# Patient Record
Sex: MALE | Race: WHITE | NOT HISPANIC OR LATINO
[De-identification: names, ages, dates, MRNs, and addresses within clinical notes are randomized per-mention and may not be internally consistent; named-entity substitution may affect disease eponyms.]

---

## 2019-04-30 PROBLEM — Z00.00 ENCOUNTER FOR PREVENTIVE HEALTH EXAMINATION: Status: ACTIVE | Noted: 2019-04-30

## 2019-10-03 ENCOUNTER — APPOINTMENT (OUTPATIENT)
Dept: CARDIOLOGY | Facility: CLINIC | Age: 69
End: 2019-10-03
Payer: MEDICARE

## 2019-10-03 PROCEDURE — 93000 ELECTROCARDIOGRAM COMPLETE: CPT

## 2019-10-03 PROCEDURE — 99214 OFFICE O/P EST MOD 30 MIN: CPT

## 2019-10-17 ENCOUNTER — OUTPATIENT (OUTPATIENT)
Dept: OUTPATIENT SERVICES | Facility: HOSPITAL | Age: 69
LOS: 1 days | Discharge: HOME | End: 2019-10-17
Payer: MEDICARE

## 2019-10-17 DIAGNOSIS — R07.89 OTHER CHEST PAIN: ICD-10-CM

## 2019-10-17 PROCEDURE — 78452 HT MUSCLE IMAGE SPECT MULT: CPT | Mod: 26

## 2019-10-31 ENCOUNTER — APPOINTMENT (OUTPATIENT)
Dept: CARDIOLOGY | Facility: CLINIC | Age: 69
End: 2019-10-31

## 2020-02-06 ENCOUNTER — APPOINTMENT (OUTPATIENT)
Dept: CARDIOLOGY | Facility: CLINIC | Age: 70
End: 2020-02-06
Payer: MEDICARE

## 2020-02-06 PROCEDURE — 93306 TTE W/DOPPLER COMPLETE: CPT

## 2020-02-13 ENCOUNTER — APPOINTMENT (OUTPATIENT)
Dept: CARDIOLOGY | Facility: CLINIC | Age: 70
End: 2020-02-13
Payer: MEDICARE

## 2020-02-13 PROCEDURE — 93000 ELECTROCARDIOGRAM COMPLETE: CPT

## 2020-02-13 PROCEDURE — 99214 OFFICE O/P EST MOD 30 MIN: CPT

## 2020-08-20 ENCOUNTER — RECORD ABSTRACTING (OUTPATIENT)
Age: 70
End: 2020-08-20

## 2020-08-20 DIAGNOSIS — Z87.11 PERSONAL HISTORY OF PEPTIC ULCER DISEASE: ICD-10-CM

## 2020-08-20 DIAGNOSIS — Z87.891 PERSONAL HISTORY OF NICOTINE DEPENDENCE: ICD-10-CM

## 2020-08-20 DIAGNOSIS — Z86.79 PERSONAL HISTORY OF OTHER DISEASES OF THE CIRCULATORY SYSTEM: ICD-10-CM

## 2020-08-20 DIAGNOSIS — Z87.898 PERSONAL HISTORY OF OTHER SPECIFIED CONDITIONS: ICD-10-CM

## 2020-08-20 DIAGNOSIS — Z87.19 PERSONAL HISTORY OF OTHER DISEASES OF THE DIGESTIVE SYSTEM: ICD-10-CM

## 2020-08-20 DIAGNOSIS — Z78.9 OTHER SPECIFIED HEALTH STATUS: ICD-10-CM

## 2020-08-20 DIAGNOSIS — Z86.39 PERSONAL HISTORY OF OTHER ENDOCRINE, NUTRITIONAL AND METABOLIC DISEASE: ICD-10-CM

## 2020-08-20 RX ORDER — METOPROLOL TARTRATE 100 MG/1
100 TABLET, FILM COATED ORAL TWICE DAILY
Refills: 0 | Status: ACTIVE | COMMUNITY

## 2020-08-20 RX ORDER — VALSARTAN 160 MG/1
160 TABLET ORAL TWICE DAILY
Refills: 0 | Status: ACTIVE | COMMUNITY

## 2020-08-20 RX ORDER — ESOMEPRAZOLE MAGNESIUM 40 MG/1
40 CAPSULE, DELAYED RELEASE ORAL DAILY
Refills: 0 | Status: ACTIVE | COMMUNITY

## 2020-09-15 ENCOUNTER — APPOINTMENT (OUTPATIENT)
Dept: CARDIOLOGY | Facility: CLINIC | Age: 70
End: 2020-09-15
Payer: MEDICARE

## 2020-09-15 VITALS
DIASTOLIC BLOOD PRESSURE: 80 MMHG | TEMPERATURE: 98.2 F | WEIGHT: 250 LBS | SYSTOLIC BLOOD PRESSURE: 132 MMHG | HEIGHT: 70 IN | BODY MASS INDEX: 35.79 KG/M2

## 2020-09-15 PROCEDURE — 93000 ELECTROCARDIOGRAM COMPLETE: CPT

## 2020-09-15 PROCEDURE — 99213 OFFICE O/P EST LOW 20 MIN: CPT

## 2020-09-15 NOTE — REVIEW OF SYSTEMS
[Blurry Vision] : no blurred vision [Seeing Double (Diplopia)] : no diplopia [Joint Pain] : joint pain [Skin: A Rash] : no rash: [Anxiety] : no anxiety [Easy Bleeding] : no tendency for easy bleeding [Easy Bruising] : no tendency for easy bruising [Negative] : Heme/Lymph

## 2020-09-15 NOTE — ASSESSMENT
[FreeTextEntry1] : Permanent A-fib, VR controlled.\par CHF well compensated.\par HTN controlled.\par Normal LVEF, no evidence of ischemia.\par Low risk for cardiovascular complications of TKR.\par \par Recommend:\par Continue treatment.\par Can hold Coumadin for 4 days if decides to proceed with TKR, no bridging needed.\par No SBE prophylaxis.\par \par F/u in 4 months.\par \par Sarthak Bradshaw MD\par

## 2020-09-15 NOTE — HISTORY OF PRESENT ILLNESS
[FreeTextEntry1] : 70-yo male with h/o obesity, HTN, diastolic heart failure, permanent A-fib.\par \par No CP, SOB, palpitations now. \par \par 2D ECHO 02/06/20:\par LVEF 55%\par Moderate LAE\par Mild MR, TR\par Mildly restricted and thickened AV.\par \par Adenosine MPI 10/17/19:\par No ischemia.\par

## 2020-09-15 NOTE — PHYSICAL EXAM
[General Appearance - Well Developed] : well developed [Normal Appearance] : normal appearance [Well Groomed] : well groomed [General Appearance - Well Nourished] : well nourished [No Deformities] : no deformities [General Appearance - In No Acute Distress] : no acute distress [Normal Conjunctiva] : the conjunctiva exhibited no abnormalities [Eyelids - No Xanthelasma] : the eyelids demonstrated no xanthelasmas [Respiration, Rhythm And Depth] : normal respiratory rhythm and effort [Exaggerated Use Of Accessory Muscles For Inspiration] : no accessory muscle use [Auscultation Breath Sounds / Voice Sounds] : lungs were clear to auscultation bilaterally [Normal Rate] : normal [Irregularly Irregular] : irregularly irregular [Normal S1] : normal S1 [Normal S2] : normal S2 [S3] : no S3 [S4] : no S4 [II] : a grade 2 [2+] : left 2+ [___ +] : bilateral [unfilled]U+ pitting edema to the ankles [Bowel Sounds] : normal bowel sounds [Abdomen Soft] : soft [Abdomen Tenderness] : non-tender [Abdomen Mass (___ Cm)] : no abdominal mass palpated [Cyanosis, Localized] : no localized cyanosis [Skin Color & Pigmentation] : normal skin color and pigmentation [] : no rash [Oriented To Time, Place, And Person] : oriented to person, place, and time [Affect] : the affect was normal [Mood] : the mood was normal

## 2021-01-19 ENCOUNTER — APPOINTMENT (OUTPATIENT)
Dept: CARDIOLOGY | Facility: CLINIC | Age: 71
End: 2021-01-19

## 2021-02-05 ENCOUNTER — APPOINTMENT (OUTPATIENT)
Dept: CARDIOLOGY | Facility: CLINIC | Age: 71
End: 2021-02-05
Payer: MEDICARE

## 2021-02-05 VITALS
HEIGHT: 69 IN | DIASTOLIC BLOOD PRESSURE: 82 MMHG | SYSTOLIC BLOOD PRESSURE: 122 MMHG | WEIGHT: 235 LBS | BODY MASS INDEX: 34.8 KG/M2

## 2021-02-05 DIAGNOSIS — Z01.810 ENCOUNTER FOR PREPROCEDURAL CARDIOVASCULAR EXAMINATION: ICD-10-CM

## 2021-02-05 PROCEDURE — 99214 OFFICE O/P EST MOD 30 MIN: CPT

## 2021-02-05 PROCEDURE — 93000 ELECTROCARDIOGRAM COMPLETE: CPT

## 2021-02-05 RX ORDER — DAPAGLIFLOZIN 10 MG/1
10 TABLET, FILM COATED ORAL DAILY
Refills: 0 | Status: ACTIVE | COMMUNITY

## 2021-02-10 ENCOUNTER — APPOINTMENT (OUTPATIENT)
Dept: CARDIOLOGY | Facility: CLINIC | Age: 71
End: 2021-02-10
Payer: MEDICARE

## 2021-02-10 PROCEDURE — 93306 TTE W/DOPPLER COMPLETE: CPT

## 2021-02-25 ENCOUNTER — OUTPATIENT (OUTPATIENT)
Dept: OUTPATIENT SERVICES | Facility: HOSPITAL | Age: 71
LOS: 1 days | Discharge: HOME | End: 2021-02-25
Payer: MEDICARE

## 2021-02-25 ENCOUNTER — RESULT REVIEW (OUTPATIENT)
Age: 71
End: 2021-02-25

## 2021-02-25 DIAGNOSIS — Z01.810 ENCOUNTER FOR PREPROCEDURAL CARDIOVASCULAR EXAMINATION: ICD-10-CM

## 2021-02-25 PROCEDURE — 93018 CV STRESS TEST I&R ONLY: CPT

## 2021-02-25 PROCEDURE — 78452 HT MUSCLE IMAGE SPECT MULT: CPT | Mod: 26

## 2021-04-14 NOTE — HISTORY OF PRESENT ILLNESS
[FreeTextEntry1] : 70-yo male with h/o obesity, HTN, diastolic heart failure, permanent A-fib.\par \par No CP, SOB, palpitations now. His BP has dropped < 100 twice in the last 2 weeks, he feels weak and tired when that happens.\par \par Patient c/o progressive left knee pain, severely limiting his walking at this point. He is undergoing evaluation for TKR.\par \par 2D ECHO 02/06/20:\par LVEF 55%\par Moderate LAE\par Mild-mod MR, mild TR\par Mildly restricted and thickened AV.\par \par

## 2021-04-14 NOTE — PHYSICAL EXAM
[General Appearance - Well Developed] : well developed [Normal Appearance] : normal appearance [Well Groomed] : well groomed [General Appearance - Well Nourished] : well nourished [No Deformities] : no deformities [General Appearance - In No Acute Distress] : no acute distress [Normal Conjunctiva] : the conjunctiva exhibited no abnormalities [Eyelids - No Xanthelasma] : the eyelids demonstrated no xanthelasmas [Respiration, Rhythm And Depth] : normal respiratory rhythm and effort [Exaggerated Use Of Accessory Muscles For Inspiration] : no accessory muscle use [Auscultation Breath Sounds / Voice Sounds] : lungs were clear to auscultation bilaterally [Normal Rate] : normal [Irregularly Irregular] : irregularly irregular [Normal S1] : normal S1 [Normal S2] : normal S2 [II] : a grade 2 [2+] : left 2+ [___ +] : bilateral [unfilled]U+ pitting edema to the ankles [Bowel Sounds] : normal bowel sounds [Abdomen Soft] : soft [Abdomen Tenderness] : non-tender [Abdomen Mass (___ Cm)] : no abdominal mass palpated [Cyanosis, Localized] : no localized cyanosis [Skin Color & Pigmentation] : normal skin color and pigmentation [] : no rash [Oriented To Time, Place, And Person] : oriented to person, place, and time [Affect] : the affect was normal [Mood] : the mood was normal [S3] : no S3 [S4] : no S4

## 2021-04-14 NOTE — ASSESSMENT
[FreeTextEntry1] : 70-yo male with h/o DM, HTN, hyperlipidemia.\par Permanent A-fib, VR controlled now.\par Chronic diastolic CHF, euvolemic today.\par Mild-mod MTR.\par Low functional capacity due to knee pain.\par Preoperative evaluation for TKR.\par \par Plan:\par Continue Metoprolol 200 mg bid.\par Continue Doxazosin in the evening.\par WIll reduce HCTZ to 3 times a week if continues to experience episodes of low BP.\par 2D ECHO.\par Adenosine MPI prior to TKR.\par F/u in 3 months.\par \par Sarthak Bradshaw MD\par \par

## 2021-04-14 NOTE — ADDENDUM
[FreeTextEntry1] : Adenosine MPI:\par No ischemia.\par \par Impression:\par Low risk for cardiovascular complications of TKR.\par \par Recommend:\par Proceed with surgery.\par Continue Metoprolol.\par Hold Coumadin for 3 days before surgery, resume after surgery. No bridging needed. \par No SBE prophylaxis.

## 2021-04-14 NOTE — REVIEW OF SYSTEMS
[see HPI] : see HPI [Joint Pain] : joint pain [Negative] : Heme/Lymph [Blurry Vision] : no blurred vision [Seeing Double (Diplopia)] : no diplopia [Lower Ext Edema] : no extremity edema [Leg Claudication] : no intermittent leg claudication [Skin: A Rash] : no rash: [Anxiety] : no anxiety [Easy Bleeding] : no tendency for easy bleeding [Easy Bruising] : no tendency for easy bruising

## 2021-05-20 ENCOUNTER — APPOINTMENT (OUTPATIENT)
Dept: CARDIOLOGY | Facility: CLINIC | Age: 71
End: 2021-05-20
Payer: MEDICARE

## 2021-05-20 VITALS
WEIGHT: 220 LBS | SYSTOLIC BLOOD PRESSURE: 116 MMHG | HEIGHT: 69 IN | BODY MASS INDEX: 32.58 KG/M2 | DIASTOLIC BLOOD PRESSURE: 70 MMHG

## 2021-05-20 DIAGNOSIS — I35.1 NONRHEUMATIC AORTIC (VALVE) INSUFFICIENCY: ICD-10-CM

## 2021-05-20 PROCEDURE — 93000 ELECTROCARDIOGRAM COMPLETE: CPT

## 2021-05-20 PROCEDURE — 99213 OFFICE O/P EST LOW 20 MIN: CPT

## 2021-05-20 NOTE — PHYSICAL EXAM
[Well Developed] : well developed [Well Nourished] : well nourished [No Acute Distress] : no acute distress [Normal Conjunctiva] : normal conjunctiva [Normal Venous Pressure] : normal venous pressure [No Carotid Bruit] : no carotid bruit [Normal Rate] : normal [Irregularly Irregular] : irregularly irregular [Normal S1] : normal S1 [Normal S2] : normal S2 [S3] : no S3 [S4] : no S4 [II] : a grade 2 [No Pitting Edema] : no pitting edema present [Clear Lung Fields] : clear lung fields [Good Air Entry] : good air entry [No Respiratory Distress] : no respiratory distress  [Soft] : abdomen soft [Non Tender] : non-tender [Normal Bowel Sounds] : normal bowel sounds [Normal Gait] : normal gait [No Edema] : no edema [No Cyanosis] : no cyanosis [No Clubbing] : no clubbing [No Varicosities] : no varicosities [No Rash] : no rash [Moves all extremities] : moves all extremities [No Focal Deficits] : no focal deficits [Normal Speech] : normal speech [Alert and Oriented] : alert and oriented [Normal memory] : normal memory

## 2021-05-20 NOTE — ASSESSMENT
[FreeTextEntry1] : 70-yo male with h/o DM, HTN, hyperlipidemia.\par Permanent A-fib, VR controlled now.\par Chronic diastolic CHF, euvolemic today.\par Mild-mod MTR.\par \par Plan:\par Continue treatment.\par Continue diet, weight loss.\par Repeat BW prior to next visit.\par F/u in 4 months.\par \par Sarthak Bradshaw MD\par \par

## 2021-05-20 NOTE — HISTORY OF PRESENT ILLNESS
[FreeTextEntry1] : 70-yo male with h/o obesity, HTN, diastolic heart failure, permanent A-fib.\par \par No CP, SOB, palpitations now. His BP has been stable. S/p left TKR, uncomplicated. Undergoing PT.\par \par \par GFR 80\par \par TSH 0.9.\par

## 2021-05-20 NOTE — CARDIOLOGY SUMMARY
[de-identified] : 05/20/21:\par A-fib, VR 71/min, NSST changes. [de-identified] : 02/06/20:\par LVEF 55%\par Moderate LAE\par Mild-mod MR, mild TR\par Mildly restricted and thickened AV. [de-identified] : Adenosine MPI 02/25/21:\par No ischemia.

## 2021-08-24 ENCOUNTER — APPOINTMENT (OUTPATIENT)
Dept: CARDIOLOGY | Facility: CLINIC | Age: 71
End: 2021-08-24
Payer: MEDICARE

## 2021-08-24 VITALS
HEIGHT: 60 IN | SYSTOLIC BLOOD PRESSURE: 120 MMHG | DIASTOLIC BLOOD PRESSURE: 70 MMHG | BODY MASS INDEX: 42.6 KG/M2 | TEMPERATURE: 97.2 F | WEIGHT: 217 LBS

## 2021-08-24 PROCEDURE — 93000 ELECTROCARDIOGRAM COMPLETE: CPT

## 2021-08-24 PROCEDURE — 99214 OFFICE O/P EST MOD 30 MIN: CPT

## 2021-08-24 NOTE — CARDIOLOGY SUMMARY
[de-identified] : 08/24/21:\par A-fib, VR 80/min, NSST changes. [de-identified] : 02/10/21:\par LVEF 63%\par LAE, ANGELIKA\par Mod MR, mild TR, AI\par Mildly restricted and thickened AV. [de-identified] : Adenosine MPI 02/25/21:\par No ischemia.

## 2021-08-24 NOTE — HISTORY OF PRESENT ILLNESS
[FreeTextEntry1] : 70-yo male with h/o obesity, HTN, diastolic heart failure, permanent A-fib.\par \par S/p left TKR, uncomplicated. Tolerated PT and exercised regularly until 2 weeks ago when he started feeling progressive HERRERA. 2 days ago, he wasn't able to walk without stopping to catch his breath. No CP or palpitations. Increased leg edema.\par \par \par GFR 67\par K 3.7\par LDL 93\par TSH 1.12.\par

## 2021-08-24 NOTE — PHYSICAL EXAM
[Well Developed] : well developed [Well Nourished] : well nourished [No Acute Distress] : no acute distress [Normal Conjunctiva] : normal conjunctiva [Normal Venous Pressure] : normal venous pressure [No Carotid Bruit] : no carotid bruit [Normal Rate] : normal [Irregularly Irregular] : irregularly irregular [Normal S1] : normal S1 [Normal S2] : normal S2 [II] : a grade 2 [Clear Lung Fields] : clear lung fields [Good Air Entry] : good air entry [No Respiratory Distress] : no respiratory distress  [Soft] : abdomen soft [Non Tender] : non-tender [Normal Bowel Sounds] : normal bowel sounds [Normal Gait] : normal gait [No Edema] : no edema [No Cyanosis] : no cyanosis [No Clubbing] : no clubbing [No Varicosities] : no varicosities [No Rash] : no rash [Moves all extremities] : moves all extremities [No Focal Deficits] : no focal deficits [Normal Speech] : normal speech [Alert and Oriented] : alert and oriented [Normal memory] : normal memory [S3] : no S3 [S4] : no S4 [___ +] : bilateral [unfilled]U+ pitting edema to the ankles

## 2021-08-24 NOTE — ASSESSMENT
[FreeTextEntry1] : 70-yo male with h/o DM, HTN, hyperlipidemia.\par Permanent A-fib, VR controlled now.\par Chronic diastolic CHF, decompensation now likely due to dietary non-compliance.\par Moderate MR.\par \par Plan:\par Hold HCTZ.\par Increase Furosemide to 40 mg bid. Add K-dur.\par Low-salt diet, weight loss discussed again.\par F/u in 1 week.\par \par Sarthak Bradshaw MD\par \par

## 2021-08-24 NOTE — REVIEW OF SYSTEMS
[Negative] : Neurological [Dyspnea on exertion] : dyspnea during exertion [Lower Ext Edema] : lower extremity edema [Leg Claudication] : no intermittent leg claudication [Palpitations] : no palpitations [Orthopnea] : orthopnea [Syncope] : no syncope [Rash] : no rash [Anxiety] : no anxiety [Easy Bleeding] : no tendency for easy bleeding [Easy Bruising] : no tendency for easy bruising

## 2021-08-25 ENCOUNTER — RESULT CHARGE (OUTPATIENT)
Age: 71
End: 2021-08-25

## 2021-09-09 ENCOUNTER — APPOINTMENT (OUTPATIENT)
Dept: CARDIOLOGY | Facility: CLINIC | Age: 71
End: 2021-09-09
Payer: MEDICARE

## 2021-09-09 VITALS
BODY MASS INDEX: 43.19 KG/M2 | HEIGHT: 60 IN | WEIGHT: 220 LBS | DIASTOLIC BLOOD PRESSURE: 84 MMHG | SYSTOLIC BLOOD PRESSURE: 132 MMHG

## 2021-09-09 PROCEDURE — 93000 ELECTROCARDIOGRAM COMPLETE: CPT

## 2021-09-09 PROCEDURE — 99213 OFFICE O/P EST LOW 20 MIN: CPT

## 2021-09-09 NOTE — ASSESSMENT
[FreeTextEntry1] : 70-yo male with h/o DM, HTN, hyperlipidemia.\par Permanent A-fib, VR controlled now.\par Chronic diastolic CHF, decompensation now likely due to dietary non-compliance.\par Moderate MR.\par \par Plan:\par Continue Furosemide 40 mg with K-dur.\par Low-salt diet, weight loss discussed again.\par F/u in 1 month.\par \par Sarthak Bradshaw MD\par \par  I have personally seen and examined this patient.  I have fully participated in the care of this patient. I have reviewed all pertinent clinical information, including history, physical exam, plan and the Resident’s note and agree except as noted.

## 2021-09-09 NOTE — HISTORY OF PRESENT ILLNESS
[FreeTextEntry1] : 71-yo male with h/o obesity, HTN, diastolic heart failure, permanent A-fib.\par \par S/p left TKR, uncomplicated. Tolerated PT and exercised regularly initially but developed fluid overload due to dietary non-compliance. Cough and orthopnea have resolved since last visit but patient still c/o dyspnea on minimal exertion. No CP or palpitations.\par \par GFR 67\par K 3.7\par LDL 93\par TSH 1.12.\par

## 2021-09-09 NOTE — REVIEW OF SYSTEMS
[Dyspnea on exertion] : dyspnea during exertion [Lower Ext Edema] : lower extremity edema [Negative] : Neurological [Leg Claudication] : no intermittent leg claudication [Palpitations] : no palpitations [Syncope] : no syncope [Rash] : no rash [Anxiety] : no anxiety [Easy Bleeding] : no tendency for easy bleeding [Easy Bruising] : no tendency for easy bruising

## 2021-09-09 NOTE — PHYSICAL EXAM
[Well Developed] : well developed [Well Nourished] : well nourished [No Acute Distress] : no acute distress [Normal Conjunctiva] : normal conjunctiva [Normal Venous Pressure] : normal venous pressure [No Carotid Bruit] : no carotid bruit [Normal Rate] : normal [Irregularly Irregular] : irregularly irregular [Normal S1] : normal S1 [Normal S2] : normal S2 [II] : a grade 2 [___ +] : bilateral [unfilled]U+ pitting edema to the ankles [Clear Lung Fields] : clear lung fields [Good Air Entry] : good air entry [No Respiratory Distress] : no respiratory distress  [Soft] : abdomen soft [Non Tender] : non-tender [Normal Bowel Sounds] : normal bowel sounds [Normal Gait] : normal gait [No Cyanosis] : no cyanosis [No Clubbing] : no clubbing [No Varicosities] : no varicosities [No Rash] : no rash [Moves all extremities] : moves all extremities [No Focal Deficits] : no focal deficits [Normal Speech] : normal speech [Alert and Oriented] : alert and oriented [Normal memory] : normal memory [S3] : no S3 [S4] : no S4 [Edema ___] : edema [unfilled]

## 2021-09-09 NOTE — CARDIOLOGY SUMMARY
[de-identified] : 09/09/21:\par A-fib, VR 65/min, NSST changes. [de-identified] : 02/10/21:\par LVEF 63%\par LAE, ANGELIKA\par Mod MR, mild TR, AI\par Mildly restricted and thickened AV. [de-identified] : Adenosine MPI 02/25/21:\par No ischemia.

## 2021-09-10 ENCOUNTER — RESULT CHARGE (OUTPATIENT)
Age: 71
End: 2021-09-10

## 2021-09-30 ENCOUNTER — APPOINTMENT (OUTPATIENT)
Dept: CARDIOLOGY | Facility: CLINIC | Age: 71
End: 2021-09-30

## 2021-11-05 ENCOUNTER — APPOINTMENT (OUTPATIENT)
Dept: CARDIOLOGY | Facility: CLINIC | Age: 71
End: 2021-11-05
Payer: MEDICARE

## 2021-11-05 VITALS
BODY MASS INDEX: 43.19 KG/M2 | DIASTOLIC BLOOD PRESSURE: 80 MMHG | SYSTOLIC BLOOD PRESSURE: 128 MMHG | WEIGHT: 220 LBS | HEIGHT: 60 IN

## 2021-11-05 PROCEDURE — 93000 ELECTROCARDIOGRAM COMPLETE: CPT

## 2021-11-05 PROCEDURE — 99214 OFFICE O/P EST MOD 30 MIN: CPT

## 2021-11-05 RX ORDER — HYDROCHLOROTHIAZIDE 12.5 MG/1
12.5 CAPSULE ORAL DAILY
Refills: 0 | Status: DISCONTINUED | COMMUNITY
End: 2021-11-05

## 2021-11-05 NOTE — ASSESSMENT
[FreeTextEntry1] : 70-yo male with h/o DM, HTN, hyperlipidemia.\par Permanent A-fib, VR controlled now.\par Chronic diastolic CHF, decompensation now likely due to dietary non-compliance.\par Moderate MR.\par \par Plan:\par Will try to replace Furosemide with Torsemide 20 mg bid, continue K-dur.\par Low-salt diet, weight loss discussed again.\par Blood work from PMD.\par F/u in 1 month.\par \par Sarthak Bradshaw MD\par \par

## 2021-11-05 NOTE — CARDIOLOGY SUMMARY
[de-identified] : 09/09/21:\par A-fib, VR 72/min, NSST changes. [de-identified] : 02/10/21:\par LVEF 63%\par LAE, ANGELIKA\par Mod MR, mild TR, AI\par Mildly restricted and thickened AV. [de-identified] : Adenosine MPI 02/25/21:\par No ischemia.

## 2021-11-05 NOTE — REVIEW OF SYSTEMS
[Dyspnea on exertion] : dyspnea during exertion [Negative] : Neurological [Lower Ext Edema] : lower extremity edema [Leg Claudication] : no intermittent leg claudication [Palpitations] : no palpitations [Syncope] : no syncope [Rash] : no rash [Anxiety] : no anxiety [Easy Bleeding] : no tendency for easy bleeding [Easy Bruising] : no tendency for easy bruising

## 2021-11-05 NOTE — HISTORY OF PRESENT ILLNESS
[FreeTextEntry1] : 71-yo male with h/o obesity, HTN, diastolic heart failure, permanent A-fib.\par \par S/p left TKR, uncomplicated. Tolerated PT and exercised regularly initially but developed fluid overload due to dietary non-compliance. HERRERA has improved but not resolved since last visit. He feels more SOB immediately if he skips even 1 dose of Furosemide.. No CP or palpitations.\par \par \par

## 2021-12-28 ENCOUNTER — APPOINTMENT (OUTPATIENT)
Dept: CARDIOLOGY | Facility: CLINIC | Age: 71
End: 2021-12-28
Payer: MEDICARE

## 2021-12-28 VITALS
BODY MASS INDEX: 42.8 KG/M2 | HEART RATE: 67 BPM | WEIGHT: 218 LBS | DIASTOLIC BLOOD PRESSURE: 75 MMHG | HEIGHT: 60 IN | SYSTOLIC BLOOD PRESSURE: 125 MMHG

## 2021-12-28 PROCEDURE — 93000 ELECTROCARDIOGRAM COMPLETE: CPT

## 2021-12-28 PROCEDURE — 99214 OFFICE O/P EST MOD 30 MIN: CPT

## 2021-12-28 RX ORDER — MUPIROCIN 20 MG/G
2 OINTMENT TOPICAL
Qty: 22 | Refills: 0 | Status: DISCONTINUED | COMMUNITY
Start: 2021-12-09

## 2021-12-28 RX ORDER — DUTASTERIDE 0.5 MG/1
0.5 CAPSULE, LIQUID FILLED ORAL
Qty: 30 | Refills: 0 | Status: ACTIVE | COMMUNITY
Start: 2021-12-07

## 2021-12-28 RX ORDER — ALCOHOL PREP PADS 0.7 ML/1
70 SWAB TOPICAL
Qty: 100 | Refills: 0 | Status: DISCONTINUED | COMMUNITY
Start: 2021-12-10

## 2021-12-28 RX ORDER — METFORMIN HYDROCHLORIDE 500 MG/1
500 TABLET, COATED ORAL DAILY
Refills: 0 | Status: DISCONTINUED | COMMUNITY
End: 2021-12-28

## 2021-12-28 RX ORDER — TAMSULOSIN HYDROCHLORIDE 0.4 MG/1
0.4 CAPSULE ORAL
Qty: 30 | Refills: 0 | Status: ACTIVE | COMMUNITY
Start: 2021-12-07

## 2021-12-28 NOTE — HISTORY OF PRESENT ILLNESS
[FreeTextEntry1] : 71-yo male with h/o obesity, HTN, diastolic heart failure, permanent A-fib, moderate MR.\par \par S/p left TKR, uncomplicated. Tolerated PT and exercised regularly initially but developed fluid overload due to dietary non-compliance. \par \par HERRERA and leg edema have improved since last visit but patient is unable to leave the house for hours after each dose.\par \par INR has been unstable in the last few months.

## 2021-12-28 NOTE — ASSESSMENT
[FreeTextEntry1] : 70-yo male with h/o DM, HTN, hyperlipidemia.\par Permanent A-fib, VR controlled now. INR has been difficult to control.\par Chronic diastolic CHF. Patient has clearly improved with Torsemide.\par Obesity.\par Moderate MR.\par \par Plan:\par Continue Torsemide 20 mg bid, continue K-dur. I explained to the patient that weight loss and reducing salt intake are the only ways to reduce the doses of his diuretics eventually. \par Will replace Coumadin with Xarelto 20 mg daily.\par Blood work and f/u with PMD scheduled.\par F/u in 3 months.\par \par Sarthak Bradshaw MD\par \par

## 2021-12-28 NOTE — CARDIOLOGY SUMMARY
[de-identified] : 12/28/21:\par A-fib, VR 79/min, NSST changes. [de-identified] : 02/10/21:\par LVEF 63%\par LAE, ANGELIKA\par Mod MR, mild TR, AI\par Mildly restricted and thickened AV. [de-identified] : Adenosine MPI 02/25/21:\par No ischemia.

## 2022-01-05 ENCOUNTER — RX RENEWAL (OUTPATIENT)
Age: 72
End: 2022-01-05

## 2022-01-31 ENCOUNTER — RX RENEWAL (OUTPATIENT)
Age: 72
End: 2022-01-31

## 2022-02-23 ENCOUNTER — RX RENEWAL (OUTPATIENT)
Age: 72
End: 2022-02-23

## 2022-03-24 ENCOUNTER — APPOINTMENT (OUTPATIENT)
Dept: CARDIOLOGY | Facility: CLINIC | Age: 72
End: 2022-03-24
Payer: MEDICARE

## 2022-03-24 ENCOUNTER — RESULT CHARGE (OUTPATIENT)
Age: 72
End: 2022-03-24

## 2022-03-24 VITALS
WEIGHT: 230 LBS | BODY MASS INDEX: 45.16 KG/M2 | SYSTOLIC BLOOD PRESSURE: 120 MMHG | DIASTOLIC BLOOD PRESSURE: 74 MMHG | HEIGHT: 60 IN

## 2022-03-24 PROCEDURE — 93000 ELECTROCARDIOGRAM COMPLETE: CPT

## 2022-03-24 PROCEDURE — 99213 OFFICE O/P EST LOW 20 MIN: CPT

## 2022-03-24 RX ORDER — WARFARIN 6 MG/1
6 TABLET ORAL DAILY
Refills: 0 | Status: DISCONTINUED | COMMUNITY
End: 2022-03-24

## 2022-03-24 RX ORDER — WARFARIN 1 MG/1
1 TABLET ORAL
Qty: 30 | Refills: 0 | Status: DISCONTINUED | COMMUNITY
Start: 2021-12-21 | End: 2022-03-24

## 2022-03-24 RX ORDER — FUROSEMIDE 40 MG/1
40 TABLET ORAL TWICE DAILY
Qty: 60 | Refills: 1 | Status: DISCONTINUED | COMMUNITY
End: 2022-03-24

## 2022-03-24 RX ORDER — DOXAZOSIN 2 MG/1
2 TABLET ORAL DAILY
Refills: 0 | Status: ACTIVE | COMMUNITY

## 2022-03-24 RX ORDER — WARFARIN 5 MG/1
5 TABLET ORAL
Qty: 30 | Refills: 0 | Status: DISCONTINUED | COMMUNITY
Start: 2021-12-08 | End: 2022-03-24

## 2022-03-24 NOTE — ASSESSMENT
[FreeTextEntry1] : 71-yo male with h/o DM, HTN, hyperlipidemia.\par Permanent A-fib (Xarelto).\par Chronic diastolic CHF. Patient has clearly improved with Torsemide.\par Obesity.\par Moderate MR.\par \par Plan:\par Continue Torsemide 20 mg bid, continue K-dur. I explained to the patient that weight loss and reducing salt intake are the only ways to reduce the doses of his diuretics eventually. \par Blood work and f/u with PMD scheduled.\par F/u in 6 months.\par \par Sarthak Bradshaw MD\par \par

## 2022-03-24 NOTE — HISTORY OF PRESENT ILLNESS
[FreeTextEntry1] : 71-yo male with h/o obesity, HTN, diastolic heart failure, permanent A-fib (Xarelto), moderate MR.\par \par S/p left TKR, uncomplicated. Tolerated PT and exercised regularly initially but developed fluid overload due to dietary non-compliance. \par \par HERRERA and leg edema have improved since last visit but patient is unable to leave the house for hours after each dose.\par \par

## 2022-03-24 NOTE — PHYSICAL EXAM
[Well Developed] : well developed [Well Nourished] : well nourished [No Acute Distress] : no acute distress [Normal Conjunctiva] : normal conjunctiva [Normal Venous Pressure] : normal venous pressure [No Carotid Bruit] : no carotid bruit [Normal Rate] : normal [Irregularly Irregular] : irregularly irregular [Normal S1] : normal S1 [Normal S2] : normal S2 [II] : a grade 2 [___ +] : bilateral [unfilled]U+ pitting edema to the ankles [Clear Lung Fields] : clear lung fields [Good Air Entry] : good air entry [No Respiratory Distress] : no respiratory distress  [Soft] : abdomen soft [Non Tender] : non-tender [Normal Bowel Sounds] : normal bowel sounds [Normal Gait] : normal gait [No Cyanosis] : no cyanosis [No Clubbing] : no clubbing [No Varicosities] : no varicosities [No Rash] : no rash [Moves all extremities] : moves all extremities [No Focal Deficits] : no focal deficits [Normal Speech] : normal speech [Alert and Oriented] : alert and oriented [Normal memory] : normal memory [S3] : no S3 [S4] : no S4 [No Edema] : no edema

## 2022-03-24 NOTE — CARDIOLOGY SUMMARY
[de-identified] : 03/24/22:\par A-fib, VR 67/min, NSST changes. [de-identified] : 02/10/21:\par LVEF 63%\par LAE, ANGELIKA\par Mod MR, mild TR, AI\par Mildly restricted and thickened AV. [de-identified] : Adenosine MPI 02/25/21:\par No ischemia.

## 2022-04-02 ENCOUNTER — RX RENEWAL (OUTPATIENT)
Age: 72
End: 2022-04-02

## 2022-05-23 ENCOUNTER — NON-APPOINTMENT (OUTPATIENT)
Age: 72
End: 2022-05-23

## 2022-05-24 ENCOUNTER — APPOINTMENT (OUTPATIENT)
Dept: CARDIOLOGY | Facility: CLINIC | Age: 72
End: 2022-05-24
Payer: MEDICARE

## 2022-05-24 VITALS
HEIGHT: 60 IN | HEART RATE: 75 BPM | SYSTOLIC BLOOD PRESSURE: 130 MMHG | WEIGHT: 221 LBS | BODY MASS INDEX: 43.39 KG/M2 | DIASTOLIC BLOOD PRESSURE: 86 MMHG

## 2022-05-24 DIAGNOSIS — I50.33 ACUTE ON CHRONIC DIASTOLIC (CONGESTIVE) HEART FAILURE: ICD-10-CM

## 2022-05-24 PROCEDURE — 93000 ELECTROCARDIOGRAM COMPLETE: CPT

## 2022-05-24 PROCEDURE — 99214 OFFICE O/P EST MOD 30 MIN: CPT

## 2022-05-24 NOTE — REASON FOR VISIT
[Symptom and Test Evaluation] : symptom and test evaluation [Cardiac Failure] : cardiac failure [Arrhythmia/ECG Abnorrmalities] : arrhythmia/ECG abnormalities

## 2022-05-24 NOTE — CARDIOLOGY SUMMARY
[de-identified] : 05/24/22:\par A-fib, VR 75/min, NSST changes. [de-identified] : 02/10/21:\par LVEF 63%\par LAE, ANGELIKA\par Mod MR, mild TR, AI\par Mildly restricted and thickened AV. [de-identified] : Adenosine MPI 02/25/21:\par No ischemia.

## 2022-05-24 NOTE — ASSESSMENT
[FreeTextEntry1] : 71-yo male with h/o DM, HTN, hyperlipidemia.\par Permanent A-fib (Xarelto).\par Chronic diastolic CHF. Patient has clearly improved with Torsemide.\par Obesity.\par Moderate MR.\par Hypotension for 1 week, etiology unclear. Patient does not appear volume-depleted.\par \par Plan:\par Continue Torsemide 20 mg bid, continue K-dur. \par Patient will monitor his BP and skip doses of Valsartan if BP drops < 100.\par F/u in 1 month.\par \par Sarthak Bradshaw MD\par \par

## 2022-05-24 NOTE — PHYSICAL EXAM
[Well Developed] : well developed [Well Nourished] : well nourished [No Acute Distress] : no acute distress [Normal Conjunctiva] : normal conjunctiva [Normal Venous Pressure] : normal venous pressure [No Carotid Bruit] : no carotid bruit [Normal Rate] : normal [Irregularly Irregular] : irregularly irregular [Normal S1] : normal S1 [Normal S2] : normal S2 [II] : a grade 2 [___ +] : bilateral [unfilled]U+ pitting edema to the ankles [Clear Lung Fields] : clear lung fields [Good Air Entry] : good air entry [No Respiratory Distress] : no respiratory distress  [Soft] : abdomen soft [Non Tender] : non-tender [Normal Bowel Sounds] : normal bowel sounds [Normal Gait] : normal gait [No Edema] : no edema [No Cyanosis] : no cyanosis [No Clubbing] : no clubbing [No Varicosities] : no varicosities [No Rash] : no rash [Moves all extremities] : moves all extremities [No Focal Deficits] : no focal deficits [Normal Speech] : normal speech [Alert and Oriented] : alert and oriented [Normal memory] : normal memory [S3] : no S3 [S4] : no S4

## 2022-05-24 NOTE — HISTORY OF PRESENT ILLNESS
[FreeTextEntry1] : 71-yo male with h/o obesity, HTN, diastolic heart failure, permanent A-fib (Xarelto), moderate MR.\par \par S/p left TKR, uncomplicated. Tolerated PT and exercised regularly initially but developed fluid overload due to dietary non-compliance. \par \par HERRERA and leg edema have improved. Low BP (< 90) for 1 week with dizziness and weakness. Improved today without any change in medications. Patient feels more SOB as soon as he skips a dose of Torsemide.\par \par \par GFR 70\par LDL 82.

## 2022-05-25 ENCOUNTER — RESULT CHARGE (OUTPATIENT)
Age: 72
End: 2022-05-25

## 2022-06-20 ENCOUNTER — RX RENEWAL (OUTPATIENT)
Age: 72
End: 2022-06-20

## 2022-06-24 ENCOUNTER — RX RENEWAL (OUTPATIENT)
Age: 72
End: 2022-06-24

## 2022-09-29 ENCOUNTER — RESULT CHARGE (OUTPATIENT)
Age: 72
End: 2022-09-29

## 2022-09-29 ENCOUNTER — APPOINTMENT (OUTPATIENT)
Dept: CARDIOLOGY | Facility: CLINIC | Age: 72
End: 2022-09-29

## 2022-09-29 VITALS
WEIGHT: 222 LBS | HEART RATE: 80 BPM | RESPIRATION RATE: 14 BRPM | HEIGHT: 62 IN | DIASTOLIC BLOOD PRESSURE: 64 MMHG | BODY MASS INDEX: 40.85 KG/M2 | SYSTOLIC BLOOD PRESSURE: 124 MMHG | TEMPERATURE: 97.8 F

## 2022-09-29 PROCEDURE — 93000 ELECTROCARDIOGRAM COMPLETE: CPT

## 2022-09-29 PROCEDURE — 99213 OFFICE O/P EST LOW 20 MIN: CPT

## 2022-09-29 RX ORDER — ZOLPIDEM TARTRATE 10 MG/1
10 TABLET ORAL
Qty: 30 | Refills: 0 | Status: DISCONTINUED | COMMUNITY
Start: 2021-12-21 | End: 2022-09-29

## 2022-09-29 RX ORDER — METFORMIN HYDROCHLORIDE 500 MG/1
500 TABLET, FILM COATED, EXTENDED RELEASE ORAL
Qty: 60 | Refills: 0 | Status: DISCONTINUED | COMMUNITY
Start: 2021-12-10 | End: 2022-09-29

## 2022-09-29 RX ORDER — GABAPENTIN ENACARBIL 600 MG/1
600 TABLET, EXTENDED RELEASE ORAL
Qty: 30 | Refills: 0 | Status: DISCONTINUED | COMMUNITY
Start: 2021-12-21 | End: 2022-09-29

## 2022-09-29 RX ORDER — METFORMIN HYDROCHLORIDE 850 MG/1
850 TABLET, COATED ORAL TWICE DAILY
Refills: 0 | Status: ACTIVE | COMMUNITY

## 2022-09-29 NOTE — CARDIOLOGY SUMMARY
[de-identified] : 09/29/22:\par A-fib, VR 80/min, NSST changes. [de-identified] : 02/10/21:\par LVEF 63%\par LAE, ANGELIKA\par Mod MR, mild TR, AI\par Mildly restricted and thickened AV. [de-identified] : Adenosine MPI 02/25/21:\par No ischemia.

## 2022-09-29 NOTE — HISTORY OF PRESENT ILLNESS
[FreeTextEntry1] : 72-yo male with h/o obesity, HTN, diastolic heart failure, permanent A-fib (Xarelto), moderate MR.\par \par S/p left TKR, uncomplicated. Tolerated PT and exercised regularly initially but developed fluid overload due to dietary non-compliance. \par \par HERRERA and leg edema have improved. Low BP (< 90) for 1 week with dizziness and weakness. Improved today without any change in medications. Patient feels more SOB as soon as he skips a dose of Torsemide.\par \par \par GFR 70\par LDL 82.

## 2022-09-29 NOTE — ASSESSMENT
[FreeTextEntry1] : 72-yo male with h/o DM, HTN, hyperlipidemia.\par Permanent A-fib (Xarelto).\par Chronic diastolic CHF. Patient has clearly improved with Torsemide.\par Obesity.\par Moderate MR.\par \par \par Plan:\par Continue Torsemide 20 mg bid, continue K-dur. \par Diet, weight loss discussed again.\par F/u in 4 months.\par \par Sarthak Bradshaw MD\par \par

## 2023-01-12 ENCOUNTER — RESULT CHARGE (OUTPATIENT)
Age: 73
End: 2023-01-12

## 2023-01-12 ENCOUNTER — APPOINTMENT (OUTPATIENT)
Dept: CARDIOLOGY | Facility: CLINIC | Age: 73
End: 2023-01-12
Payer: MEDICARE

## 2023-01-12 VITALS
DIASTOLIC BLOOD PRESSURE: 70 MMHG | BODY MASS INDEX: 40.67 KG/M2 | HEART RATE: 63 BPM | HEIGHT: 62 IN | WEIGHT: 221 LBS | SYSTOLIC BLOOD PRESSURE: 126 MMHG

## 2023-01-12 PROCEDURE — 93000 ELECTROCARDIOGRAM COMPLETE: CPT

## 2023-01-12 PROCEDURE — 99214 OFFICE O/P EST MOD 30 MIN: CPT

## 2023-01-12 NOTE — REVIEW OF SYSTEMS
[Dyspnea on exertion] : dyspnea during exertion [Negative] : Neurological [Lower Ext Edema] : no extremity edema [Leg Claudication] : no intermittent leg claudication [Palpitations] : no palpitations [Syncope] : no syncope [Rash] : no rash [Anxiety] : no anxiety [Easy Bleeding] : no tendency for easy bleeding [Easy Bruising] : no tendency for easy bruising

## 2023-01-12 NOTE — HISTORY OF PRESENT ILLNESS
[FreeTextEntry1] : 72-yo male with h/o obesity, HTN, diastolic heart failure, permanent A-fib (Xarelto), moderate MR.\par S/p left TKR, uncomplicated. Tolerated PT and exercised regularly initially but developed fluid overload due to dietary non-compliance. \par \par Pt denies chest pain or palpitations. Still having HERRERA and fatigue but have not worsened. \par \par LDL 86\par GFR 79\par Hgb 9.4.\par \par

## 2023-01-12 NOTE — CARDIOLOGY SUMMARY
[de-identified] : 1/12/23: [place], VR 63bpm.\par  [de-identified] : 02/10/21:\par LVEF 63%\par LAE, ANGELIKA\par Mod MR, mild TR, AI\par Mildly restricted and thickened AV. [de-identified] : Adenosine MPI 02/25/21:\par No ischemia.

## 2023-01-12 NOTE — ASSESSMENT
[FreeTextEntry1] : 72-yo male with h/o DM, HTN, hyperlipidemia.\par Permanent A-fib (Xarelto). VR well controlled.\par Chronic diastolic CHF. Patient has clearly improved with Torsemide.\par Obesity.\par Moderate MR.\par Iron deficiency anemia.\par \par \par Plan:\par Colonoscopy recommended (intermediate risk for cardiovascular complications, can hold Xarelto for 3 days). Importance explained.\par Continue Torsemide 20 mg bid, continue K-dur. \par Diet, weight loss discussed again.\par F/u in 4 months.\par \par Sarthak Bradshaw MD\par \par

## 2023-05-08 ENCOUNTER — RX RENEWAL (OUTPATIENT)
Age: 73
End: 2023-05-08

## 2023-05-11 ENCOUNTER — APPOINTMENT (OUTPATIENT)
Dept: CARDIOLOGY | Facility: CLINIC | Age: 73
End: 2023-05-11
Payer: MEDICARE

## 2023-05-11 VITALS
WEIGHT: 216 LBS | SYSTOLIC BLOOD PRESSURE: 126 MMHG | BODY MASS INDEX: 39.75 KG/M2 | HEIGHT: 62 IN | HEART RATE: 66 BPM | DIASTOLIC BLOOD PRESSURE: 80 MMHG

## 2023-05-11 DIAGNOSIS — D50.0 IRON DEFICIENCY ANEMIA SECONDARY TO BLOOD LOSS (CHRONIC): ICD-10-CM

## 2023-05-11 PROCEDURE — 99213 OFFICE O/P EST LOW 20 MIN: CPT

## 2023-05-11 PROCEDURE — 93000 ELECTROCARDIOGRAM COMPLETE: CPT

## 2023-05-11 NOTE — HISTORY OF PRESENT ILLNESS
[FreeTextEntry1] : 72-yo male with h/o obesity, HTN, diastolic heart failure, permanent A-fib (Xarelto), moderate MR.\par \par Pt denies chest pain or palpitations. Still c/o HERRERA and fatigue, stable.\par \par LDL 88\par GFR 79\par A1c 6.4.\par

## 2023-05-11 NOTE — ASSESSMENT
[FreeTextEntry1] : 72-yo male with h/o DM, HTN, hyperlipidemia.\par Permanent A-fib (Xarelto). VR well controlled.\par Chronic diastolic CHF. Patient has clearly improved with Torsemide.\par Obesity.\par Moderate MR.\par Iron deficiency anemia.\par \par \par Plan:\par Colonoscopy recommended, patient is still refusing.\par Continue Torsemide 20 mg bid, continue K-dur. \par Diet, weight loss discussed again.\par Repeat blood work scheduled.\par F/u in 4 months.\par \par Sarthak Bradshaw MD\par \par

## 2023-05-11 NOTE — CARDIOLOGY SUMMARY
[de-identified] : 05/1123: NIA Moses 66bpm.\par  [de-identified] : 02/10/21:\par LVEF 63%\par LAE, ANGELIKA\par Mod MR, mild TR, AI\par Mildly restricted and thickened AV. [de-identified] : Adenosine MPI 02/25/21:\par No ischemia.

## 2023-05-12 ENCOUNTER — RESULT CHARGE (OUTPATIENT)
Age: 73
End: 2023-05-12

## 2023-09-21 ENCOUNTER — APPOINTMENT (OUTPATIENT)
Dept: CARDIOLOGY | Facility: CLINIC | Age: 73
End: 2023-09-21
Payer: MEDICARE

## 2023-09-21 VITALS
DIASTOLIC BLOOD PRESSURE: 84 MMHG | WEIGHT: 212 LBS | HEIGHT: 62 IN | SYSTOLIC BLOOD PRESSURE: 142 MMHG | HEART RATE: 78 BPM | BODY MASS INDEX: 39.01 KG/M2

## 2023-09-21 PROCEDURE — 93000 ELECTROCARDIOGRAM COMPLETE: CPT

## 2023-09-21 PROCEDURE — 99214 OFFICE O/P EST MOD 30 MIN: CPT

## 2023-11-16 ENCOUNTER — APPOINTMENT (OUTPATIENT)
Dept: CARDIOLOGY | Facility: CLINIC | Age: 73
End: 2023-11-16
Payer: MEDICARE

## 2023-11-16 PROCEDURE — 93306 TTE W/DOPPLER COMPLETE: CPT

## 2023-11-30 ENCOUNTER — APPOINTMENT (OUTPATIENT)
Dept: CARDIOLOGY | Facility: CLINIC | Age: 73
End: 2023-11-30
Payer: MEDICARE

## 2023-11-30 VITALS
HEIGHT: 62 IN | HEART RATE: 80 BPM | BODY MASS INDEX: 40.48 KG/M2 | WEIGHT: 220 LBS | SYSTOLIC BLOOD PRESSURE: 150 MMHG | DIASTOLIC BLOOD PRESSURE: 92 MMHG

## 2023-11-30 DIAGNOSIS — I20.9 ANGINA PECTORIS, UNSPECIFIED: ICD-10-CM

## 2023-11-30 PROCEDURE — 99214 OFFICE O/P EST MOD 30 MIN: CPT

## 2023-11-30 PROCEDURE — 93000 ELECTROCARDIOGRAM COMPLETE: CPT

## 2023-11-30 RX ORDER — POTASSIUM CHLORIDE 1500 MG/1
20 TABLET, FILM COATED, EXTENDED RELEASE ORAL
Qty: 180 | Refills: 1 | Status: ACTIVE | COMMUNITY
Start: 2021-08-24 | End: 1900-01-01

## 2023-11-30 RX ORDER — RIVAROXABAN 20 MG/1
20 TABLET, FILM COATED ORAL
Qty: 90 | Refills: 1 | Status: ACTIVE | COMMUNITY
Start: 2021-12-28 | End: 1900-01-01

## 2023-11-30 RX ORDER — TORSEMIDE 20 MG/1
20 TABLET ORAL
Qty: 180 | Refills: 1 | Status: ACTIVE | COMMUNITY
Start: 2021-11-05 | End: 1900-01-01

## 2023-12-11 ENCOUNTER — RESULT REVIEW (OUTPATIENT)
Age: 73
End: 2023-12-11

## 2023-12-11 ENCOUNTER — OUTPATIENT (OUTPATIENT)
Dept: OUTPATIENT SERVICES | Facility: HOSPITAL | Age: 73
LOS: 1 days | End: 2023-12-11
Payer: MEDICARE

## 2023-12-11 DIAGNOSIS — I10 ESSENTIAL (PRIMARY) HYPERTENSION: ICD-10-CM

## 2023-12-11 DIAGNOSIS — Z00.8 ENCOUNTER FOR OTHER GENERAL EXAMINATION: ICD-10-CM

## 2023-12-11 PROCEDURE — 93018 CV STRESS TEST I&R ONLY: CPT

## 2023-12-11 PROCEDURE — 78452 HT MUSCLE IMAGE SPECT MULT: CPT | Mod: 26,MH

## 2023-12-11 PROCEDURE — A9500: CPT

## 2023-12-11 PROCEDURE — 78452 HT MUSCLE IMAGE SPECT MULT: CPT

## 2023-12-12 DIAGNOSIS — I10 ESSENTIAL (PRIMARY) HYPERTENSION: ICD-10-CM

## 2024-02-29 ENCOUNTER — APPOINTMENT (OUTPATIENT)
Dept: CARDIOLOGY | Facility: CLINIC | Age: 74
End: 2024-02-29
Payer: MEDICARE

## 2024-02-29 VITALS
DIASTOLIC BLOOD PRESSURE: 80 MMHG | WEIGHT: 220 LBS | SYSTOLIC BLOOD PRESSURE: 138 MMHG | HEART RATE: 68 BPM | HEIGHT: 62 IN | BODY MASS INDEX: 40.48 KG/M2

## 2024-02-29 DIAGNOSIS — E78.5 HYPERLIPIDEMIA, UNSPECIFIED: ICD-10-CM

## 2024-02-29 DIAGNOSIS — I34.0 NONRHEUMATIC MITRAL (VALVE) INSUFFICIENCY: ICD-10-CM

## 2024-02-29 PROCEDURE — 99214 OFFICE O/P EST MOD 30 MIN: CPT

## 2024-02-29 PROCEDURE — 93000 ELECTROCARDIOGRAM COMPLETE: CPT

## 2024-03-01 PROBLEM — E78.5 HLD (HYPERLIPIDEMIA): Status: ACTIVE | Noted: 2024-02-29

## 2024-03-01 PROBLEM — I34.0 NONRHEUMATIC MITRAL (VALVE) INSUFFICIENCY: Status: ACTIVE | Noted: 2020-08-20

## 2024-03-01 NOTE — CARDIOLOGY SUMMARY
[de-identified] : 2/29/24: A-fib, VR 68bpm, no ST changes.  [de-identified] : 11/23 Echo: EF 66%, no wall motion abn. Ascending aorta 4.1, DEEPALI 1.3, mod MR and TR, Pulm. pressure 58.  02/10/21: LVEF 63% LAE, ANGELIKA Mod MR, mild TR, AI Mildly restricted and thickened AV. [de-identified] : Adenosine MPI 02/25/21:\par  No ischemia.

## 2024-03-01 NOTE — HISTORY OF PRESENT ILLNESS
[FreeTextEntry1] : 73-yo male with h/o obesity, HTN, diastolic heart failure, permanent A-fib (Xarelto), moderate MR.  Pt presents for a follow up visit. He denies chest pain now. HERRERA has not worsened.  Leg edema improved with torsemide. Feels palpitations occasionally. BP at home 130s/80s/90s.  12/2023 Nuclear stress test: Normal myocardial perfusion. No evidence of ischemia or infarction.  9/2023: LDL 86 GFR 90 A1c 7.1 TSH 1.13 Hgb 10.

## 2024-03-01 NOTE — ASSESSMENT
[FreeTextEntry1] : 73-yo male with h/o DM, HTN, hyperlipidemia. Permanent A-fib (Xarelto). VR well controlled. Chronic diastolic CHF. Patient has clearly improved with Torsemide, weight loss. Obesity. Moderate MR, TR, AS (progression). No evidence of ischemia.  Plan: Continue treatment. Diet, weight loss discussed again. Repeat BW prior to next visit. F/u in 4 months  Sarthak Bradshaw MD.

## 2024-04-14 ENCOUNTER — OUTPATIENT (OUTPATIENT)
Dept: OUTPATIENT SERVICES | Facility: HOSPITAL | Age: 74
LOS: 1 days | End: 2024-04-14
Payer: MEDICARE

## 2024-04-14 DIAGNOSIS — R10.9 UNSPECIFIED ABDOMINAL PAIN: ICD-10-CM

## 2024-04-14 DIAGNOSIS — Z00.8 ENCOUNTER FOR OTHER GENERAL EXAMINATION: ICD-10-CM

## 2024-04-14 PROCEDURE — 74150 CT ABDOMEN W/O CONTRAST: CPT

## 2024-04-14 PROCEDURE — 74150 CT ABDOMEN W/O CONTRAST: CPT | Mod: 26,MH

## 2024-04-15 DIAGNOSIS — R10.9 UNSPECIFIED ABDOMINAL PAIN: ICD-10-CM

## 2024-06-18 ENCOUNTER — APPOINTMENT (OUTPATIENT)
Dept: CARDIOLOGY | Facility: CLINIC | Age: 74
End: 2024-06-18
Payer: MEDICARE

## 2024-06-18 VITALS
WEIGHT: 212 LBS | BODY MASS INDEX: 39.01 KG/M2 | DIASTOLIC BLOOD PRESSURE: 100 MMHG | SYSTOLIC BLOOD PRESSURE: 148 MMHG | HEART RATE: 79 BPM | HEIGHT: 62 IN

## 2024-06-18 DIAGNOSIS — I50.32 CHRONIC DIASTOLIC (CONGESTIVE) HEART FAILURE: ICD-10-CM

## 2024-06-18 DIAGNOSIS — I35.0 NONRHEUMATIC AORTIC (VALVE) STENOSIS: ICD-10-CM

## 2024-06-18 DIAGNOSIS — I10 ESSENTIAL (PRIMARY) HYPERTENSION: ICD-10-CM

## 2024-06-18 DIAGNOSIS — I48.91 UNSPECIFIED ATRIAL FIBRILLATION: ICD-10-CM

## 2024-06-18 PROCEDURE — 93000 ELECTROCARDIOGRAM COMPLETE: CPT

## 2024-06-18 PROCEDURE — G2211 COMPLEX E/M VISIT ADD ON: CPT

## 2024-06-18 PROCEDURE — 99214 OFFICE O/P EST MOD 30 MIN: CPT

## 2024-06-18 RX ORDER — ATORVASTATIN CALCIUM 10 MG/1
10 TABLET, FILM COATED ORAL
Qty: 90 | Refills: 1 | Status: DISCONTINUED | COMMUNITY
Start: 2024-02-29 | End: 2024-06-18

## 2024-06-18 RX ORDER — ROSUVASTATIN CALCIUM 10 MG/1
10 TABLET, FILM COATED ORAL DAILY
Refills: 0 | Status: ACTIVE | COMMUNITY

## 2024-06-18 RX ORDER — ROSUVASTATIN CALCIUM 10 MG/1
10 TABLET, FILM COATED ORAL DAILY
Refills: 0 | Status: DISCONTINUED | COMMUNITY
End: 2024-06-18

## 2024-06-18 RX ORDER — DILTIAZEM HYDROCHLORIDE 360 MG/1
360 CAPSULE, COATED, EXTENDED RELEASE ORAL AT BEDTIME
Qty: 90 | Refills: 1 | Status: ACTIVE | COMMUNITY
Start: 1900-01-01 | End: 1900-01-01

## 2024-06-18 RX ORDER — LINAGLIPTIN 5 MG/1
5 TABLET, FILM COATED ORAL DAILY
Refills: 0 | Status: ACTIVE | COMMUNITY

## 2024-06-18 NOTE — HISTORY OF PRESENT ILLNESS
[FreeTextEntry1] : 73-yo male with h/o obesity, HTN, diastolic heart failure, permanent A-fib (Xarelto), moderate MR, DMII  Pt presents for a follow up visit. He is c/o high blood pressure 170s and HR 110s accompanied by palpitations and headaches. Leg edema improved with torsemide.   12/2023 Nuclear stress test: Normal myocardial perfusion. No evidence of ischemia or infarction.  LDL 84 GFR 90 TSH 1.09.

## 2024-06-18 NOTE — ASSESSMENT
[FreeTextEntry1] : 73-yo male with h/o DM, HTN, hyperlipidemia. Permanent A-fib (Xarelto). VR controlled with recent episodes of plapitations. Chronic diastolic CHF. Patient has clearly improved with Torsemide, weight loss. Obesity. Moderate MR, TR, AS (progression). No evidence of ischemia. Episodes of elevated BP.  Plan: Continue Metoprolol. Increase Cardizem CD to 360 mg daily. Diet, weight loss discussed again. Repeat BW prior to next visit. F/u in 3 months  Sarthak Bradshaw MD.

## 2024-06-18 NOTE — REVIEW OF SYSTEMS
[Dyspnea on exertion] : dyspnea during exertion [Negative] : Neurological [Chest Discomfort] : no chest discomfort [Lower Ext Edema] : no extremity edema [Leg Claudication] : no intermittent leg claudication [Palpitations] : palpitations [Syncope] : no syncope [Rash] : no rash [Anxiety] : no anxiety [Easy Bleeding] : no tendency for easy bleeding [Easy Bruising] : no tendency for easy bruising

## 2024-06-18 NOTE — CARDIOLOGY SUMMARY
[de-identified] : 6/18/24: A-fib, VR 79bpm, no ST changes.  [de-identified] : 12/2023 Nuclear stress test: Normal myocardial perfusion. No evidence of ischemia or infarction.  [de-identified] : 11/23 Echo: EF 66%, no wall motion abn. Ascending aorta 4.1, DEEPALI 1.3, mod MR and TR, Pulm. pressure 58.  02/10/21: LVEF 63% LAE, ANGELIKA Mod MR, mild TR, AI Mildly restricted and thickened AV. [de-identified] : Adenosine MPI 02/25/21:\par  No ischemia.

## 2024-07-31 ENCOUNTER — RX RENEWAL (OUTPATIENT)
Age: 74
End: 2024-07-31

## 2024-09-12 ENCOUNTER — RX RENEWAL (OUTPATIENT)
Age: 74
End: 2024-09-12

## 2024-09-12 ENCOUNTER — APPOINTMENT (OUTPATIENT)
Dept: CARDIOLOGY | Facility: CLINIC | Age: 74
End: 2024-09-12
Payer: MEDICARE

## 2024-09-12 VITALS
HEIGHT: 62 IN | SYSTOLIC BLOOD PRESSURE: 152 MMHG | BODY MASS INDEX: 38.09 KG/M2 | HEART RATE: 64 BPM | WEIGHT: 207 LBS | DIASTOLIC BLOOD PRESSURE: 100 MMHG

## 2024-09-12 DIAGNOSIS — E11.9 TYPE 2 DIABETES MELLITUS W/OUT COMPLICATIONS: ICD-10-CM

## 2024-09-12 DIAGNOSIS — E78.5 HYPERLIPIDEMIA, UNSPECIFIED: ICD-10-CM

## 2024-09-12 DIAGNOSIS — I10 ESSENTIAL (PRIMARY) HYPERTENSION: ICD-10-CM

## 2024-09-12 DIAGNOSIS — I50.32 CHRONIC DIASTOLIC (CONGESTIVE) HEART FAILURE: ICD-10-CM

## 2024-09-12 DIAGNOSIS — I34.0 NONRHEUMATIC MITRAL (VALVE) INSUFFICIENCY: ICD-10-CM

## 2024-09-12 DIAGNOSIS — I35.0 NONRHEUMATIC AORTIC (VALVE) STENOSIS: ICD-10-CM

## 2024-09-12 DIAGNOSIS — I48.91 UNSPECIFIED ATRIAL FIBRILLATION: ICD-10-CM

## 2024-09-12 PROCEDURE — G2211 COMPLEX E/M VISIT ADD ON: CPT

## 2024-09-12 PROCEDURE — 93000 ELECTROCARDIOGRAM COMPLETE: CPT

## 2024-09-12 PROCEDURE — 99214 OFFICE O/P EST MOD 30 MIN: CPT

## 2024-09-12 NOTE — HISTORY OF PRESENT ILLNESS
[FreeTextEntry1] : 74-yo male with h/o obesity, HTN, diastolic heart failure, permanent A-fib (Xarelto), moderate MR, DM type II  Pt presents for a follow up visit. Reports that his BP is still on the higher end at home. He reports occasional palpitations. Also reports dyspnea on exertion, which limits his ability to walk outside. Reports being compliant with medications Leg edema improved with Torsemide.   12/2023 Nuclear stress test: Normal myocardial perfusion. No evidence of ischemia or infarction.   GFR 78 TSH 1.41 A1C 7.3.

## 2024-09-12 NOTE — ASSESSMENT
[FreeTextEntry1] : 75 YO M with h/o DM, HTN, hyperlipidemia. Permanent A-fib (Xarelto). VR controlled. Chronic diastolic CHF. Persistent HERRERA. HLD. Worsening LDL.  HTN uncontrolled  Obesity. VHD: Moderate MR, TR, AS (progression). No evidence of ischemia on NST. Episodes of elevated BP.  Plan: Continue Metoprolol succinate 200mg PO BID Continue Cardizem CD to 360 mg daily. Increase Rosuvastatin to 20mg PO QHS Increase Doxazosin to 4 mg daily Repeat Echo before next visit. Diet, weight loss discussed again. Tele visit in 3 weeks. F/u in 4 months.  Sarthak Bradshaw MD

## 2024-09-12 NOTE — CARDIOLOGY SUMMARY
[de-identified] : 9/12/24: A-fib. VR 64 bpm.  6/18/24: A-fib, VR 79bpm, no ST changes.  [de-identified] : 12/2023 Nuclear stress test: Normal myocardial perfusion. No evidence of ischemia or infarction.  [de-identified] : 11/23 Echo: EF 66%, no wall motion abn. Ascending aorta 4.1, DEEPALI 1.3, mod MR and TR, Pulm. pressure 58.  02/10/21: LVEF 63% LAE, ANGELIKA Mod MR, mild TR, AI Mildly restricted and thickened AV. [de-identified] : Adenosine MPI 02/25/21: No ischemia.

## 2024-09-12 NOTE — REVIEW OF SYSTEMS
[Dyspnea on exertion] : dyspnea during exertion [Palpitations] : palpitations [Negative] : Neurological [Chest Discomfort] : no chest discomfort [Lower Ext Edema] : no extremity edema [Leg Claudication] : no intermittent leg claudication [Syncope] : no syncope [Rash] : no rash [Anxiety] : no anxiety [Easy Bleeding] : no tendency for easy bleeding [Easy Bruising] : no tendency for easy bruising

## 2024-09-20 ENCOUNTER — OUTPATIENT (OUTPATIENT)
Dept: OUTPATIENT SERVICES | Facility: HOSPITAL | Age: 74
LOS: 1 days | End: 2024-09-20
Payer: MEDICARE

## 2024-09-20 DIAGNOSIS — Z13.820 ENCOUNTER FOR SCREENING FOR OSTEOPOROSIS: ICD-10-CM

## 2024-09-20 DIAGNOSIS — Z00.8 ENCOUNTER FOR OTHER GENERAL EXAMINATION: ICD-10-CM

## 2024-09-20 PROCEDURE — 77080 DXA BONE DENSITY AXIAL: CPT | Mod: 26

## 2024-09-20 PROCEDURE — 77080 DXA BONE DENSITY AXIAL: CPT

## 2024-09-21 DIAGNOSIS — Z13.820 ENCOUNTER FOR SCREENING FOR OSTEOPOROSIS: ICD-10-CM

## 2024-09-24 DIAGNOSIS — M81.0 AGE-RELATED OSTEOPOROSIS WITHOUT CURRENT PATHOLOGICAL FRACTURE: ICD-10-CM

## 2024-10-14 ENCOUNTER — APPOINTMENT (OUTPATIENT)
Dept: CARDIOLOGY | Facility: CLINIC | Age: 74
End: 2024-10-14

## 2024-11-07 ENCOUNTER — RX RENEWAL (OUTPATIENT)
Age: 74
End: 2024-11-07

## 2024-11-28 ENCOUNTER — RX RENEWAL (OUTPATIENT)
Age: 74
End: 2024-11-28

## 2024-12-19 ENCOUNTER — APPOINTMENT (OUTPATIENT)
Dept: CARDIOLOGY | Facility: CLINIC | Age: 74
End: 2024-12-19
Payer: MEDICARE

## 2024-12-19 PROCEDURE — 99441: CPT | Mod: 93

## 2025-01-23 ENCOUNTER — APPOINTMENT (OUTPATIENT)
Dept: CARDIOLOGY | Facility: CLINIC | Age: 75
End: 2025-01-23
Payer: MEDICARE

## 2025-01-23 VITALS
BODY MASS INDEX: 39.56 KG/M2 | WEIGHT: 215 LBS | HEIGHT: 62 IN | HEART RATE: 71 BPM | DIASTOLIC BLOOD PRESSURE: 80 MMHG | SYSTOLIC BLOOD PRESSURE: 142 MMHG

## 2025-01-23 DIAGNOSIS — I48.91 UNSPECIFIED ATRIAL FIBRILLATION: ICD-10-CM

## 2025-01-23 DIAGNOSIS — I34.0 NONRHEUMATIC MITRAL (VALVE) INSUFFICIENCY: ICD-10-CM

## 2025-01-23 DIAGNOSIS — I50.32 CHRONIC DIASTOLIC (CONGESTIVE) HEART FAILURE: ICD-10-CM

## 2025-01-23 DIAGNOSIS — E11.9 TYPE 2 DIABETES MELLITUS W/OUT COMPLICATIONS: ICD-10-CM

## 2025-01-23 DIAGNOSIS — I10 ESSENTIAL (PRIMARY) HYPERTENSION: ICD-10-CM

## 2025-01-23 DIAGNOSIS — E78.5 HYPERLIPIDEMIA, UNSPECIFIED: ICD-10-CM

## 2025-01-23 DIAGNOSIS — I35.0 NONRHEUMATIC AORTIC (VALVE) STENOSIS: ICD-10-CM

## 2025-01-23 PROCEDURE — 99214 OFFICE O/P EST MOD 30 MIN: CPT

## 2025-01-23 PROCEDURE — 93306 TTE W/DOPPLER COMPLETE: CPT

## 2025-01-23 PROCEDURE — 93000 ELECTROCARDIOGRAM COMPLETE: CPT

## 2025-02-12 ENCOUNTER — RX RENEWAL (OUTPATIENT)
Age: 75
End: 2025-02-12

## 2025-05-05 ENCOUNTER — RX RENEWAL (OUTPATIENT)
Age: 75
End: 2025-05-05

## 2025-05-07 ENCOUNTER — RX RENEWAL (OUTPATIENT)
Age: 75
End: 2025-05-07

## 2025-07-23 ENCOUNTER — RX RENEWAL (OUTPATIENT)
Age: 75
End: 2025-07-23

## 2025-09-08 ENCOUNTER — APPOINTMENT (OUTPATIENT)
Dept: CARDIOLOGY | Facility: CLINIC | Age: 75
End: 2025-09-08
Payer: MEDICARE

## 2025-09-08 VITALS
DIASTOLIC BLOOD PRESSURE: 80 MMHG | SYSTOLIC BLOOD PRESSURE: 120 MMHG | HEIGHT: 62 IN | BODY MASS INDEX: 39.56 KG/M2 | WEIGHT: 215 LBS | HEART RATE: 61 BPM

## 2025-09-08 DIAGNOSIS — I10 ESSENTIAL (PRIMARY) HYPERTENSION: ICD-10-CM

## 2025-09-08 DIAGNOSIS — I35.0 NONRHEUMATIC AORTIC (VALVE) STENOSIS: ICD-10-CM

## 2025-09-08 DIAGNOSIS — E78.5 HYPERLIPIDEMIA, UNSPECIFIED: ICD-10-CM

## 2025-09-08 DIAGNOSIS — I48.91 UNSPECIFIED ATRIAL FIBRILLATION: ICD-10-CM

## 2025-09-08 DIAGNOSIS — I50.32 CHRONIC DIASTOLIC (CONGESTIVE) HEART FAILURE: ICD-10-CM

## 2025-09-08 PROCEDURE — 93000 ELECTROCARDIOGRAM COMPLETE: CPT

## 2025-09-08 PROCEDURE — 99214 OFFICE O/P EST MOD 30 MIN: CPT

## 2025-09-08 RX ORDER — TORSEMIDE 10 MG/1
10 TABLET ORAL DAILY
Refills: 0 | Status: ACTIVE | COMMUNITY

## 2025-09-08 RX ORDER — SPIRONOLACTONE 50 MG/1
TABLET ORAL DAILY
Refills: 0 | Status: ACTIVE | COMMUNITY

## 2025-09-08 RX ORDER — DOXAZOSIN 1 MG/1
1 TABLET ORAL DAILY
Refills: 0 | Status: DISCONTINUED | COMMUNITY
End: 2025-09-08